# Patient Record
Sex: FEMALE | Race: BLACK OR AFRICAN AMERICAN | NOT HISPANIC OR LATINO
[De-identification: names, ages, dates, MRNs, and addresses within clinical notes are randomized per-mention and may not be internally consistent; named-entity substitution may affect disease eponyms.]

---

## 2017-03-26 DIAGNOSIS — Z78.9 OTHER SPECIFIED HEALTH STATUS: ICD-10-CM

## 2017-03-26 DIAGNOSIS — Z00.5 ENCOUNTER FOR EXAMINATION OF POTENTIAL DONOR OF ORGAN AND TISSUE: ICD-10-CM

## 2017-03-26 DIAGNOSIS — Z86.59 PERSONAL HISTORY OF OTHER MENTAL AND BEHAVIORAL DISORDERS: ICD-10-CM

## 2017-05-17 PROBLEM — Z00.00 ENCOUNTER FOR PREVENTIVE HEALTH EXAMINATION: Status: ACTIVE | Noted: 2017-05-17

## 2017-05-19 ENCOUNTER — APPOINTMENT (OUTPATIENT)
Dept: TRANSPLANT | Facility: CLINIC | Age: 27
End: 2017-05-19

## 2017-05-24 PROBLEM — Z00.5 WILLING TO BE KIDNEY DONOR: Status: ACTIVE | Noted: 2017-05-24

## 2017-05-24 PROBLEM — Z78.9 SOCIAL ALCOHOL USE: Status: ACTIVE | Noted: 2017-05-24

## 2017-05-24 PROBLEM — Z86.59 HISTORY OF MAJOR DEPRESSION: Status: RESOLVED | Noted: 2017-05-24 | Resolved: 2017-05-24

## 2017-06-05 ENCOUNTER — APPOINTMENT (OUTPATIENT)
Dept: TRANSPLANT | Facility: CLINIC | Age: 27
End: 2017-06-05

## 2017-06-05 ENCOUNTER — APPOINTMENT (OUTPATIENT)
Dept: NEPHROLOGY | Facility: CLINIC | Age: 27
End: 2017-06-05

## 2017-06-05 ENCOUNTER — APPOINTMENT (OUTPATIENT)
Dept: CT IMAGING | Facility: IMAGING CENTER | Age: 27
End: 2017-06-05

## 2017-06-19 ENCOUNTER — APPOINTMENT (OUTPATIENT)
Dept: NEPHROLOGY | Facility: CLINIC | Age: 27
End: 2017-06-19

## 2017-06-19 ENCOUNTER — APPOINTMENT (OUTPATIENT)
Dept: TRANSPLANT | Facility: CLINIC | Age: 27
End: 2017-06-19

## 2017-06-19 ENCOUNTER — APPOINTMENT (OUTPATIENT)
Dept: CT IMAGING | Facility: CLINIC | Age: 27
End: 2017-06-19

## 2017-06-19 ENCOUNTER — OUTPATIENT (OUTPATIENT)
Dept: OUTPATIENT SERVICES | Facility: HOSPITAL | Age: 27
LOS: 1 days | End: 2017-06-19
Payer: COMMERCIAL

## 2017-06-19 VITALS
WEIGHT: 138 LBS | BODY MASS INDEX: 22.18 KG/M2 | HEART RATE: 72 BPM | TEMPERATURE: 99 F | SYSTOLIC BLOOD PRESSURE: 119 MMHG | HEIGHT: 66 IN | OXYGEN SATURATION: 99 % | DIASTOLIC BLOOD PRESSURE: 72 MMHG | RESPIRATION RATE: 14 BRPM

## 2017-06-19 VITALS — BODY MASS INDEX: 23.07 KG/M2 | HEIGHT: 65 IN | WEIGHT: 138.5 LBS

## 2017-06-19 DIAGNOSIS — D25.9 LEIOMYOMA OF UTERUS, UNSPECIFIED: ICD-10-CM

## 2017-06-19 DIAGNOSIS — N92.0 EXCESSIVE AND FREQUENT MENSTRUATION WITH REGULAR CYCLE: ICD-10-CM

## 2017-06-19 DIAGNOSIS — F41.9 ANXIETY DISORDER, UNSPECIFIED: ICD-10-CM

## 2017-06-19 DIAGNOSIS — Z00.5 ENCOUNTER FOR EXAMINATION OF POTENTIAL DONOR OF ORGAN AND TISSUE: ICD-10-CM

## 2017-06-19 DIAGNOSIS — F32.9 ANXIETY DISORDER, UNSPECIFIED: ICD-10-CM

## 2017-06-19 DIAGNOSIS — Z82.49 FAMILY HISTORY OF ISCHEMIC HEART DISEASE AND OTHER DISEASES OF THE CIRCULATORY SYSTEM: ICD-10-CM

## 2017-06-19 PROCEDURE — 71046 X-RAY EXAM CHEST 2 VIEWS: CPT

## 2017-06-19 RX ORDER — NORETHINDRONE ACETATE AND ETHINYL ESTRADIOL 1; 20 MG/1; UG/1
1-20 TABLET ORAL DAILY
Qty: 30 | Refills: 0 | Status: ACTIVE | COMMUNITY
Start: 2017-06-19

## 2017-06-19 RX ORDER — IBUPROFEN 600 MG/1
600 TABLET, FILM COATED ORAL
Qty: 30 | Refills: 0 | Status: ACTIVE | COMMUNITY
Start: 2017-06-19

## 2017-06-21 PROBLEM — F41.9 ANXIETY AND DEPRESSION: Status: ACTIVE | Noted: 2017-06-21

## 2017-06-21 PROBLEM — N92.0 MENORRHAGIA WITH REGULAR CYCLE: Status: ACTIVE | Noted: 2017-06-21

## 2017-06-29 DIAGNOSIS — Z52.4 KIDNEY DONOR: ICD-10-CM

## 2018-01-19 ENCOUNTER — INPATIENT (INPATIENT)
Facility: HOSPITAL | Age: 28
LOS: 2 days | Discharge: HOME | End: 2018-01-22
Attending: INTERNAL MEDICINE | Admitting: INTERNAL MEDICINE
Payer: MEDICAID

## 2018-01-19 VITALS — HEIGHT: 65 IN | WEIGHT: 139.99 LBS

## 2018-01-19 DIAGNOSIS — F32.9 MAJOR DEPRESSIVE DISORDER, SINGLE EPISODE, UNSPECIFIED: ICD-10-CM

## 2018-01-19 DIAGNOSIS — R56.9 UNSPECIFIED CONVULSIONS: ICD-10-CM

## 2018-01-19 LAB
ALBUMIN SERPL ELPH-MCNC: 4.2 G/DL — SIGNIFICANT CHANGE UP (ref 3.3–5)
ALP SERPL-CCNC: 65 U/L — SIGNIFICANT CHANGE UP (ref 40–120)
ALT FLD-CCNC: 21 U/L — SIGNIFICANT CHANGE UP (ref 12–78)
ANION GAP SERPL CALC-SCNC: 6 MMOL/L — SIGNIFICANT CHANGE UP (ref 5–17)
APTT BLD: 29.1 SEC — SIGNIFICANT CHANGE UP (ref 27.5–37.4)
AST SERPL-CCNC: 13 U/L — LOW (ref 15–37)
BILIRUB SERPL-MCNC: 0.2 MG/DL — SIGNIFICANT CHANGE UP (ref 0.2–1.2)
BUN SERPL-MCNC: 9 MG/DL — SIGNIFICANT CHANGE UP (ref 7–23)
CALCIUM SERPL-MCNC: 9.1 MG/DL — SIGNIFICANT CHANGE UP (ref 8.5–10.1)
CHLORIDE SERPL-SCNC: 104 MMOL/L — SIGNIFICANT CHANGE UP (ref 96–108)
CO2 SERPL-SCNC: 28 MMOL/L — SIGNIFICANT CHANGE UP (ref 22–31)
CREAT SERPL-MCNC: 0.99 MG/DL — SIGNIFICANT CHANGE UP (ref 0.5–1.3)
GLUCOSE BLDC GLUCOMTR-MCNC: 63 MG/DL — LOW (ref 70–99)
GLUCOSE SERPL-MCNC: 65 MG/DL — LOW (ref 70–99)
HCG SERPL-ACNC: <1 MIU/ML — SIGNIFICANT CHANGE UP
HCT VFR BLD CALC: 37.8 % — SIGNIFICANT CHANGE UP (ref 34.5–45)
HGB BLD-MCNC: 12.5 G/DL — SIGNIFICANT CHANGE UP (ref 11.5–15.5)
INR BLD: 1.13 RATIO — SIGNIFICANT CHANGE UP (ref 0.88–1.16)
MCHC RBC-ENTMCNC: 28.4 PG — SIGNIFICANT CHANGE UP (ref 27–34)
MCHC RBC-ENTMCNC: 33.1 GM/DL — SIGNIFICANT CHANGE UP (ref 32–36)
MCV RBC AUTO: 85.7 FL — SIGNIFICANT CHANGE UP (ref 80–100)
NRBC # BLD: ABNORMAL (ref 0–0)
PLATELET # BLD AUTO: 252 K/UL — SIGNIFICANT CHANGE UP (ref 150–400)
POTASSIUM SERPL-MCNC: 4.3 MMOL/L — SIGNIFICANT CHANGE UP (ref 3.5–5.3)
POTASSIUM SERPL-SCNC: 4.3 MMOL/L — SIGNIFICANT CHANGE UP (ref 3.5–5.3)
PROT SERPL-MCNC: 8 GM/DL — SIGNIFICANT CHANGE UP (ref 6–8.3)
PROTHROM AB SERPL-ACNC: 12.3 SEC — SIGNIFICANT CHANGE UP (ref 9.8–12.7)
RBC # BLD: 4.41 M/UL — SIGNIFICANT CHANGE UP (ref 3.8–5.2)
RBC # FLD: 12.4 % — SIGNIFICANT CHANGE UP (ref 11–15)
SODIUM SERPL-SCNC: 138 MMOL/L — SIGNIFICANT CHANGE UP (ref 135–145)
WBC # BLD: 3.4 K/UL — LOW (ref 3.8–10.5)
WBC # FLD AUTO: 3.4 K/UL — LOW (ref 3.8–10.5)

## 2018-01-19 PROCEDURE — 99285 EMERGENCY DEPT VISIT HI MDM: CPT

## 2018-01-19 PROCEDURE — 70450 CT HEAD/BRAIN W/O DYE: CPT | Mod: 26

## 2018-01-19 RX ORDER — SODIUM CHLORIDE 9 MG/ML
1000 INJECTION, SOLUTION INTRAVENOUS
Qty: 0 | Refills: 0 | Status: DISCONTINUED | OUTPATIENT
Start: 2018-01-19 | End: 2018-01-22

## 2018-01-19 RX ORDER — BUPROPION HYDROCHLORIDE 150 MG/1
150 TABLET, EXTENDED RELEASE ORAL DAILY
Qty: 0 | Refills: 0 | Status: DISCONTINUED | OUTPATIENT
Start: 2018-01-19 | End: 2018-01-22

## 2018-01-19 RX ADMIN — SODIUM CHLORIDE 70 MILLILITER(S): 9 INJECTION, SOLUTION INTRAVENOUS at 23:22

## 2018-01-19 NOTE — H&P ADULT - HISTORY OF PRESENT ILLNESS
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 Seizure was witnessed. . has some sup lacs to the forehead. . patient is alert awake now

## 2018-01-19 NOTE — H&P ADULT - NSHPLABSRESULTS_GEN_ALL_CORE
12.5   3.4   )-----------( 252      ( 19 Jan 2018 17:05 )             37.8     01-19    138  |  104  |  9   ----------------------------<  65<L>  4.3   |  28  |  0.99    Ca    9.1      19 Jan 2018 17:05    TPro  8.0  /  Alb  4.2  /  TBili  0.2  /  DBili  x   /  AST  13<L>  /  ALT  21  /  AlkPhos  65  01-19    PT/INR - ( 19 Jan 2018 17:05 )   PT: 12.3 sec;   INR: 1.13 ratio         PTT - ( 19 Jan 2018 17:05 )  PTT:29.1 sec

## 2018-01-19 NOTE — ED PROVIDER NOTE - HEAD SHAPE
normal cephalic/2 small lacertion, skin flaps, >25 cm . annot suture. cleaned and  steristrips applied.

## 2018-01-19 NOTE — ED PROVIDER NOTE - OBJECTIVE STATEMENT
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 Seizure was witnesed. . has some sup lacs to the forehead. . patient is alert awake now , ambulating and able to provide good hx. no hx of medical problems other than depression for which she takes Bupropion 450 mg daily. Has no cardiac disease. patient denies any headache, neck pains. No hx of recent fever.. Has hx of fibroids and last month had surgery for it. . Not . no children  No significant social or family hx.. 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 Seizure was witnesed. . has some sup lacs to the forehead. . patient is alert awake now , ambulating and able to provide good hx. no hx of medical problems other than depression for which she takes Bupropion 450 mg daily. Has no cardiac disease. patient denies any headache, neck pains. No hx of recent fever.. Has hx of fibroids and last month had surgery for it. . Not . no children  No significant social or family hx.. 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 Seizure was witnessed. . has some sup lacs to the forehead. . patient is alert awake now , ambulating and able to provide good hx. no hx of medical problems other than depression for which she takes Bupropion 450 mg daily. Has no cardiac disease. patient denies any headache, neck pains. No hx of recent fever.. Has hx of fibroids and last month had surgery for it. . Not . no children  No significant social or family hx.. no allergy to medications. Had Td immunization within the last 10 years as per pt.

## 2018-01-19 NOTE — H&P ADULT - ASSESSMENT
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 Seizure was witnessed.

## 2018-01-20 ENCOUNTER — TRANSCRIPTION ENCOUNTER (OUTPATIENT)
Age: 28
End: 2018-01-20

## 2018-01-20 RX ADMIN — BUPROPION HYDROCHLORIDE 150 MILLIGRAM(S): 150 TABLET, EXTENDED RELEASE ORAL at 11:05

## 2018-01-20 NOTE — DISCHARGE NOTE ADULT - CARE PROVIDER_API CALL
Zachary Oleary (), Internal Medicine  [address] [place] [address]  Phone: [phone]  Fax: [phone]    Esequiel Ramirez), Neurology  [address] [place] [address]  Phone: [phone]  Fax: [phone]    Psychiatrist,   Phone: (   )    -  Fax: (   )    -

## 2018-01-20 NOTE — DISCHARGE NOTE ADULT - MEDICATION SUMMARY - MEDICATIONS TO CHANGE
I will SWITCH the dose or number of times a day I take the medications listed below when I get home from the hospital:  None I will SWITCH the dose or number of times a day I take the medications listed below when I get home from the hospital:    buPROPion 450 mg/24 hours (XL) oral tablet, extended release  -- 1 tab(s) by mouth every 24 hours

## 2018-01-20 NOTE — DISCHARGE NOTE ADULT - PROVIDER TOKENS
TOKEN:'[id number]:'[id number]',FREE:[LAST:[Psychiatrist],PHONE:[(   )    -],FAX:[(   )    -]]

## 2018-01-20 NOTE — DISCHARGE NOTE ADULT - HOSPITAL COURSE
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 Seizure was witnessed. . 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 Seizure was witnessed.    Stable for discharge home w/ outpt follow up with PMD and psychiatrist.

## 2018-01-20 NOTE — DISCHARGE NOTE ADULT - PLAN OF CARE
follow up with Dr CATHERINE [phone] follow up with pmd in 1 week Follow up with psychiatrist within 1 week to adjust meds with intent to discontinue use of Bupropion in observance of seizure

## 2018-01-20 NOTE — DISCHARGE NOTE ADULT - MEDICATION SUMMARY - MEDICATIONS TO TAKE
I will START or STAY ON the medications listed below when I get home from the hospital:    buPROPion 450 mg/24 hours (XL) oral tablet, extended release  -- 1 tab(s) by mouth every 24 hours  -- Indication: For Depression, unspecified depression type I will START or STAY ON the medications listed below when I get home from the hospital:    Remeron 45 mg oral tablet  -- 1 tab(s) by mouth once a day (at bedtime)  -- Indication: For Antidepressant    buPROPion 150 mg/24 hours (XL) oral tablet, extended release  -- 1 tab(s) by mouth once a day  -- Indication: For Antidepressant

## 2018-01-20 NOTE — PROGRESS NOTE ADULT - SUBJECTIVE AND OBJECTIVE BOX
Patient is a 27y old  Female who presents with a chief complaint of seizure (20 Jan 2018 16:37)      INTERVAL HPI/OVERNIGHT EVENTS:  no more seizure activity  MEDICATIONS  (STANDING):  buPROPion XL . 150 milliGRAM(s) Oral daily  sodium chloride 0.45%. 1000 milliLiter(s) (70 mL/Hr) IV Continuous <Continuous>    MEDICATIONS  (PRN):      Allergies    No Known Allergies    Intolerances        REVIEW OF SYSTEMS:  CONSTITUTIONAL: No fever, weight loss, or fatigue  EYES: No eye pain, visual disturbances, or discharge  ENMT:  No difficulty hearing, tinnitus, vertigo; No sinus or throat pain  NECK: No pain or stiffness  BREASTS: No pain, masses, or nipple discharge  RESPIRATORY: No cough, wheezing, chills or hemoptysis; No shortness of breath  CARDIOVASCULAR: No chest pain, palpitations, dizziness, or leg swelling  GASTROINTESTINAL: No abdominal or epigastric pain. No nausea, vomiting, or hematemesis; No diarrhea or constipation. No melena or hematochezia.  GENITOURINARY: No dysuria, frequency, hematuria, or incontinence  NEUROLOGICAL: No headaches, memory loss, loss of strength, numbness, or tremors  SKIN: No itching, burning, rashes, or lesions   LYMPH NODES: No enlarged glands  ENDOCRINE: No heat or cold intolerance; No hair loss  MUSCULOSKELETAL: No joint pain or swelling; No muscle, back, or extremity pain  PSYCHIATRIC: No depression, anxiety, mood swings, or difficulty sleeping  HEME/LYMPH: No easy bruising, or bleeding gums  ALLERGY AND IMMUNOLOGIC: No hives or eczema    Vital Signs Last 24 Hrs  T(C): 36.6 (20 Jan 2018 11:14), Max: 36.8 (19 Jan 2018 19:27)  T(F): 97.9 (20 Jan 2018 11:14), Max: 98.2 (19 Jan 2018 19:27)  HR: 91 (20 Jan 2018 11:14) (71 - 96)  BP: 108/60 (20 Jan 2018 11:14) (99/51 - 114/69)  BP(mean): --  RR: 16 (20 Jan 2018 11:14) (16 - 17)  SpO2: 100% (20 Jan 2018 11:14) (97% - 100%)    PHYSICAL EXAM:  GENERAL: NAD, well-groomed, well-developed  HEAD:  Atraumatic, Normocephalic  EYES: EOMI, PERRLA, conjunctiva and sclera clear  ENMT: No tonsillar erythema, exudates, or enlargement; Moist mucous membranes, Good dentition, No lesions  NECK: Supple, No JVD, Normal thyroid  NERVOUS SYSTEM:  Alert & Oriented X3, Good concentration; Motor Strength 5/5 B/L upper and lower extremities; DTRs 2+ intact and symmetric  CHEST/LUNG: Clear to percussion bilaterally; No rales, rhonchi, wheezing, or rubs  HEART: Regular rate and rhythm; No murmurs, rubs, or gallops  ABDOMEN: Soft, Nontender, Nondistended; Bowel sounds present  EXTREMITIES:  2+ Peripheral Pulses, No clubbing, cyanosis, or edema  LYMPH: No lymphadenopathy noted  SKIN: No rashes or lesions    LABS:                        12.5   3.4   )-----------( 252      ( 19 Jan 2018 17:05 )             37.8     01-19    138  |  104  |  9   ----------------------------<  65<L>  4.3   |  28  |  0.99    Ca    9.1      19 Jan 2018 17:05    TPro  8.0  /  Alb  4.2  /  TBili  0.2  /  DBili  x   /  AST  13<L>  /  ALT  21  /  AlkPhos  65  01-19    PT/INR - ( 19 Jan 2018 17:05 )   PT: 12.3 sec;   INR: 1.13 ratio         PTT - ( 19 Jan 2018 17:05 )  PTT:29.1 sec    CAPILLARY BLOOD GLUCOSE        CULTURES:    HEMOGLOBIN A1C:    CHOLESTEROL:        RADIOLOGY & ADDITIONAL TESTS:

## 2018-01-21 PROCEDURE — 70551 MRI BRAIN STEM W/O DYE: CPT | Mod: 26

## 2018-01-21 RX ADMIN — BUPROPION HYDROCHLORIDE 150 MILLIGRAM(S): 150 TABLET, EXTENDED RELEASE ORAL at 11:07

## 2018-01-21 RX ADMIN — SODIUM CHLORIDE 70 MILLILITER(S): 9 INJECTION, SOLUTION INTRAVENOUS at 22:52

## 2018-01-21 NOTE — PROGRESS NOTE ADULT - SUBJECTIVE AND OBJECTIVE BOX
Patient is a 27y old  Female who presents with a chief complaint of seizure (20 Jan 2018 16:37)      INTERVAL HPI/OVERNIGHT EVENTS:  pt is doing well  MEDICATIONS  (STANDING):  buPROPion XL . 150 milliGRAM(s) Oral daily  sodium chloride 0.45%. 1000 milliLiter(s) (70 mL/Hr) IV Continuous <Continuous>    MEDICATIONS  (PRN):      Allergies    No Known Allergies    Intolerances        REVIEW OF SYSTEMS:  CONSTITUTIONAL: No fever, weight loss, or fatigue  EYES: No eye pain, visual disturbances, or discharge  ENMT:  No difficulty hearing, tinnitus, vertigo; No sinus or throat pain  NECK: No pain or stiffness  BREASTS: No pain, masses, or nipple discharge  RESPIRATORY: No cough, wheezing, chills or hemoptysis; No shortness of breath  CARDIOVASCULAR: No chest pain, palpitations, dizziness, or leg swelling  GASTROINTESTINAL: No abdominal or epigastric pain. No nausea, vomiting, or hematemesis; No diarrhea or constipation. No melena or hematochezia.  GENITOURINARY: No dysuria, frequency, hematuria, or incontinence  NEUROLOGICAL: No headaches, memory loss, loss of strength, numbness, or tremors  SKIN: No itching, burning, rashes, or lesions   LYMPH NODES: No enlarged glands  ENDOCRINE: No heat or cold intolerance; No hair loss  MUSCULOSKELETAL: No joint pain or swelling; No muscle, back, or extremity pain  PSYCHIATRIC: No depression, anxiety, mood swings, or difficulty sleeping  HEME/LYMPH: No easy bruising, or bleeding gums  ALLERGY AND IMMUNOLOGIC: No hives or eczema    Vital Signs Last 24 Hrs  T(C): 36.1 (21 Jan 2018 11:39), Max: 36.7 (20 Jan 2018 17:32)  T(F): 97 (21 Jan 2018 11:39), Max: 98 (20 Jan 2018 17:32)  HR: 85 (21 Jan 2018 11:39) (70 - 86)  BP: 111/68 (21 Jan 2018 11:39) (105/71 - 144/71)  BP(mean): --  RR: 16 (21 Jan 2018 11:39) (16 - 16)  SpO2: 99% (21 Jan 2018 11:39) (98% - 100%)    PHYSICAL EXAM:  GENERAL: NAD, well-groomed, well-developed  HEAD:  Atraumatic, Normocephalic  EYES: EOMI, PERRLA, conjunctiva and sclera clear  ENMT: No tonsillar erythema, exudates, or enlargement; Moist mucous membranes, Good dentition, No lesions  NECK: Supple, No JVD, Normal thyroid  NERVOUS SYSTEM:  Alert & Oriented X3, Good concentration; Motor Strength 5/5 B/L upper and lower extremities; DTRs 2+ intact and symmetric  CHEST/LUNG: Clear to percussion bilaterally; No rales, rhonchi, wheezing, or rubs  HEART: Regular rate and rhythm; No murmurs, rubs, or gallops  ABDOMEN: Soft, Nontender, Nondistended; Bowel sounds present  EXTREMITIES:  2+ Peripheral Pulses, No clubbing, cyanosis, or edema  LYMPH: No lymphadenopathy noted  SKIN: No rashes or lesions    LABS:              CAPILLARY BLOOD GLUCOSE        CULTURES:    HEMOGLOBIN A1C:    CHOLESTEROL:        RADIOLOGY & ADDITIONAL TESTS:

## 2018-01-21 NOTE — CONSULT NOTE ADULT - ASSESSMENT
Historian:     Patient.  ER notes also reviewed.  Brain CT and MRI are both normal.  HPI:   NICKI LUGO .. 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 Seizure was witnessed. . Pt sustained some sup lacerations to the forehead. . At ER, patient was alert awake now (19 Jan 2018 23:11)     With her consent, I spoke to her uncle, Mr. Malik who is inquiring about her condition.  Gave him report of CT and MRI.  EEG to be done tomorrow.  PAST MEDICAL & SURGICAL HISTORY:  Depression on Wellbutrin and Remeron    MEDICATIONS  (STANDING):  buPROPion XL . 150 milliGRAM(s) Oral daily  sodium chloride 0.45%. 1000 milliLiter(s) (70 mL/Hr) IV Continuous <Continuous>    MEDICATIONS  (PRN):  Allergies: No Known Allergies  Intolerances  FAMILY HISTORY:    Vital Signs Last 24 Hrs  T(C): 36.6 (21 Jan 2018 17:24), Max: 36.6 (20 Jan 2018 23:38)  T(F): 97.8 (21 Jan 2018 17:24), Max: 97.8 (20 Jan 2018 23:38)  HR: 93 (21 Jan 2018 17:24) (70 - 93)  BP: 113/63 (21 Jan 2018 17:24) (105/71 - 113/69)  BP(mean): --  RR: 16 (21 Jan 2018 11:39) (16 - 16)  SpO2: 100% (21 Jan 2018 17:24) (99% - 100%)    NEUROLOGICAL EXAM:  HENT:  Normocephalic head; forehead superficial lacerations.  Neck supple.  ENT: normal looking.  Mental State:    Alert.  Fully oriented to person, place and date.  Coherent.  Speech clear and intact.  Cooperative.  Responds appropriately.    Cranial Nerves:  II-XII:   Pupils round and reactive to light and accommodation.  Extraocular movements full.  Visual fields full (no homonymous hemianopsia).  Visual acuity wnl.  Facial symmetry intact.  Tongue midline.  Motor Functions:  Moves all extremities.  No pronator drift of UE.  Claps hands well.  Hand  intact bilaterally.      Sensory Functions:   Intact to touch and pinprick to face and extremities.  Reflexes:  Deep tendon reflexes normoactive to biceps, knees and ankles.    Cerebellar Testing:    Finger to nose intact.  Nystagmus absent.  Neurovascular: Carotid auscultation full without bruits.    LABS:  RADIOLOGY & ADDITIONAL STUDIES:      Assessment & Opinion:  Seizure Disorder, first episode, etiology to be determined.  Normal neurologic exam  Recommendations:  Echocardiogram.  EEG.     Medications:  Defer anti-convulsant for now.  Will follow after EEG.

## 2018-01-22 VITALS
TEMPERATURE: 97 F | OXYGEN SATURATION: 98 % | WEIGHT: 127.87 LBS | DIASTOLIC BLOOD PRESSURE: 57 MMHG | SYSTOLIC BLOOD PRESSURE: 98 MMHG | HEART RATE: 68 BPM | RESPIRATION RATE: 16 BRPM

## 2018-01-22 RX ORDER — BUPROPION HYDROCHLORIDE 150 MG/1
1 TABLET, EXTENDED RELEASE ORAL
Qty: 14 | Refills: 0 | OUTPATIENT
Start: 2018-01-22 | End: 2018-02-04

## 2018-01-22 RX ORDER — MIRTAZAPINE 45 MG/1
1 TABLET, ORALLY DISINTEGRATING ORAL
Qty: 0 | Refills: 0 | COMMUNITY

## 2018-01-22 RX ORDER — BUPROPION HYDROCHLORIDE 150 MG/1
1 TABLET, EXTENDED RELEASE ORAL
Qty: 0 | Refills: 0 | COMMUNITY

## 2018-01-22 RX ADMIN — SODIUM CHLORIDE 70 MILLILITER(S): 9 INJECTION, SOLUTION INTRAVENOUS at 11:45

## 2018-01-22 RX ADMIN — BUPROPION HYDROCHLORIDE 150 MILLIGRAM(S): 150 TABLET, EXTENDED RELEASE ORAL at 11:45

## 2018-01-22 NOTE — CHART NOTE - NSCHARTNOTEFT_GEN_A_CORE
Hospitalist Medicine PA    Discussed patient's case with Dr. Monaco who recommends patient continue on Bupropion  daily in observance of new seizure. Patient educated that bupriopion can lower seizure threshold. Patient urged to see psychiatrist as outpatient as soon as possible to adjust medications. Dr. Monaco recommends outpatient psychiatrist to d/c bupropion and switch to another antidepressant as appropriate.

## 2018-01-22 NOTE — PROGRESS NOTE ADULT - SUBJECTIVE AND OBJECTIVE BOX
INTERVAL HPI/OVERNIGHT EVENTS:  Subjective Complaints:  None    RECENT RADIOLOGY & ADDITIONAL TESTS:  EEG done today result pending.    NEUROLOGICAL EXAM (Pertinent):  Vital Signs Last 24 Hrs  T(C): 36.1 (22 Jan 2018 05:39), Max: 36.6 (21 Jan 2018 17:24)  T(F): 96.9 (22 Jan 2018 05:39), Max: 97.8 (21 Jan 2018 17:24)  HR: 68 (22 Jan 2018 05:39) (68 - 93)  BP: 98/57 (22 Jan 2018 05:39) (98/57 - 113/63)  BP(mean): --  RR: 16 (22 Jan 2018 05:39) (16 - 16)  SpO2: 98% (22 Jan 2018 05:39) (98% - 100%)    MEDICATIONS  (STANDING):  buPROPion XL . 150 milliGRAM(s) Oral daily  sodium chloride 0.45%. 1000 milliLiter(s) (70 mL/Hr) IV Continuous <Continuous>    MEDICATIONS  (PRN):    LABS:    ASSESSMENT & OPINION:  No recurrent seizure episode.  RECOMMENDATIONS:  No anticonvulsant.  Neurologically cleared for discharge. Office follow up.

## 2018-01-24 DIAGNOSIS — F32.9 MAJOR DEPRESSIVE DISORDER, SINGLE EPISODE, UNSPECIFIED: ICD-10-CM

## 2018-01-24 DIAGNOSIS — G40.909 EPILEPSY, UNSPECIFIED, NOT INTRACTABLE, WITHOUT STATUS EPILEPTICUS: ICD-10-CM

## 2019-09-03 NOTE — PATIENT PROFILE ADULT. - NS TRANSFER DISPOSITION PATIENT BELONGINGS
writer and Alireza Chapin processed this at [facility]  This with patient

## 2020-05-09 ENCOUNTER — EMERGENCY (EMERGENCY)
Facility: HOSPITAL | Age: 30
LOS: 1 days | Discharge: HOME | End: 2020-05-09
Admitting: PERSONAL EMERGENCY RESPONSE ATTENDANT
Payer: COMMERCIAL

## 2020-05-09 VITALS
SYSTOLIC BLOOD PRESSURE: 133 MMHG | TEMPERATURE: 98 F | HEART RATE: 104 BPM | DIASTOLIC BLOOD PRESSURE: 93 MMHG | RESPIRATION RATE: 20 BRPM | OXYGEN SATURATION: 98 %

## 2020-05-09 LAB
HCT VFR BLD CALC: 35.3 % — SIGNIFICANT CHANGE UP (ref 34.5–45)
HGB BLD-MCNC: 11.7 G/DL — SIGNIFICANT CHANGE UP (ref 11.5–15.5)
MCHC RBC-ENTMCNC: 27.6 PG — SIGNIFICANT CHANGE UP (ref 27–34)
MCHC RBC-ENTMCNC: 33.1 % — SIGNIFICANT CHANGE UP (ref 32–36)
MCV RBC AUTO: 83.3 FL — SIGNIFICANT CHANGE UP (ref 80–100)
PLATELET # BLD AUTO: 245 K/UL — SIGNIFICANT CHANGE UP (ref 150–400)
PMV BLD: 10.8 FL — SIGNIFICANT CHANGE UP (ref 7–13)
RBC # BLD: 4.24 M/UL — SIGNIFICANT CHANGE UP (ref 3.8–5.2)
RBC # FLD: 13.7 % — SIGNIFICANT CHANGE UP (ref 10.3–14.5)

## 2020-05-09 PROCEDURE — 93010 ELECTROCARDIOGRAM REPORT: CPT | Mod: NC

## 2020-05-09 PROCEDURE — 99284 EMERGENCY DEPT VISIT MOD MDM: CPT | Mod: 25

## 2020-05-09 RX ORDER — HALOPERIDOL DECANOATE 100 MG/ML
5 INJECTION INTRAMUSCULAR ONCE
Refills: 0 | Status: COMPLETED | OUTPATIENT
Start: 2020-05-09 | End: 2020-05-09

## 2020-05-09 RX ADMIN — HALOPERIDOL DECANOATE 5 MILLIGRAM(S): 100 INJECTION INTRAMUSCULAR at 23:32

## 2020-05-09 NOTE — ED PROVIDER NOTE - NSFOLLOWUPINSTRUCTIONS_ED_ALL_ED_FT
1.  Please avoid all use of marijuana or K2.    2.  Please follow-up with your outpatient psychiatric provider or the crisis center (information listed below).    3.  Please return to the emergency department should you develop a desire to harm yourself or others.

## 2020-05-09 NOTE — ED ADULT TRIAGE NOTE - CHIEF COMPLAINT QUOTE
as per patient, "I got something from my friend after work.- unsure if it was K2" as per EMS, Patient took something from her friend, on the way back home she called her friend, and the friend recognized she was not acting like herself and called EMS. Patient noted with erratic behavior in ED from being calm 1 minute to loud outburst. patient has extreme anxiety at times to sudden laughter. patient stating , "I don't know what I took, am I going to be alright". Patient denies suicidal thoughts, no thoughts to harm others. no visual or auditory hallucination. Patient denies alcohol use tonight as per patient, "I got something from my friend after work.- unsure if it was K2" as per EMS, Patient took something from her friend, on the way back home she called her friend, and the friend recognized she was not acting like herself and called EMS. Patient noted with erratic behavior in ED from being calm 1 minute to loud outburst. patient has extreme anxiety at times to sudden laughter. patient stating , "I don't know what I took, am I going to be alright". Patient denies suicidal thoughts, no thoughts to harm others. no visual or auditory hallucination. Patient denies alcohol use tonight accompanied by [employer]. not under arrest as per patient, "I got something from my friend after work.- unsure if it was K2" as per EMS, Patient took something from her friend, on the way back home she called her friend, and the friend recognized she was not acting like herself and called EMS. Patient noted with erratic behavior in ED from being calm 1 minute to loud outburst. patient has extreme anxiety at times to sudden laughter. patient stating , "I don't know what I took, am I going to be alright". Patient denies suicidal thoughts, no thoughts to harm others. no visual or auditory hallucination. Patient denies alcohol use tonight accompanied by [employer]. not under arrest Medical history- depression

## 2020-05-09 NOTE — ED PROVIDER NOTE - PROGRESS NOTE DETAILS
Attending MD Hanson.  Pt signed out to me in stable condition pending Labs, psych consult, likely TBA, Hx of MDD, paranoid and delusional tonight, sees demons, may have taken K2, had shot of haldol, psych called and likely TBA. Attending MD Hanson.  PT seen and cleared by psych.  Delusional presentation likely attributable to use of marijuana and K2 which pt endorses.  Psych attending calling parents to pick pt up.  Stable for discharge home.

## 2020-05-09 NOTE — ED PROVIDER NOTE - NSFOLLOWUPCLINICS_GEN_ALL_ED_FT
[facility]  Behavioral Health  [address] [place] [address]  Phone: [phone]  Fax:   Follow Up Time:

## 2020-05-09 NOTE — ED PROVIDER NOTE - PATIENT PORTAL LINK FT
You can access the FollowMyHealth Patient Portal offered by [facility] by registering at the following website: [email or web address]. By joining [facility]’s FollowMyHealth portal, you will also be able to view your health information using other applications (apps) compatible with our system.

## 2020-05-09 NOTE — ED PROVIDER NOTE - OBJECTIVE STATEMENT
28 y/o  F hx MDD  BIBA  secondary to gloria  and acting out behaviour.  Patient appears paranoid and disorganized expressing a flight of ideas. States " Demons are chasing me".   Denies SI/HI/AH/VH.  Denies pain,  SOB, fever, chills, chest/abdominal discomfort. Denies falling, punching or kicking  any objects.  No evidence of physical injuries., broken skin or deformities.  Denies use of alcohol or illicit  drugs.

## 2020-05-09 NOTE — ED ADULT NURSE NOTE - CHIEF COMPLAINT QUOTE
as per patient, "I got something from my friend after work.- unsure if it was K2" as per EMS, Patient took something from her friend, on the way back home she called her friend, and the friend recognized she was not acting like herself and called EMS. Patient noted with erratic behavior in ED from being calm 1 minute to loud outburst. patient has extreme anxiety at times to sudden laughter. patient stating , "I don't know what I took, am I going to be alright". Patient denies suicidal thoughts, no thoughts to harm others. no visual or auditory hallucination. Patient denies alcohol use tonight accompanied by [employer]. not under arrest Medical history- depression

## 2020-05-09 NOTE — ED PROVIDER NOTE - CLINICAL SUMMARY MEDICAL DECISION MAKING FREE TEXT BOX
Labs, Urine Tox/UA, HCG, EKG  Medical evaluation performed. There is no clinical evidence of intoxication or any acute medical problem requiring immediate intervention. Patient is awaiting psychiatric consultation. Final disposition will be determined by psychiatrist. 28 y/o  F hx MDD    Labs, Urine Tox/UA, HCG, EKG  Medical evaluation performed. There is no clinical evidence of intoxication or any acute medical problem requiring immediate intervention. Patient is awaiting psychiatric consultation. Final disposition will be determined by psychiatrist.

## 2020-05-09 NOTE — ED PROVIDER NOTE - CARE PLAN
Principal Discharge DX:	Psychosis Principal Discharge DX:	Psychosis  Secondary Diagnosis:	Marijuana use

## 2020-05-10 VITALS
DIASTOLIC BLOOD PRESSURE: 83 MMHG | RESPIRATION RATE: 16 BRPM | TEMPERATURE: 98 F | SYSTOLIC BLOOD PRESSURE: 122 MMHG | HEART RATE: 87 BPM | OXYGEN SATURATION: 100 %

## 2020-05-10 DIAGNOSIS — F33.0 MAJOR DEPRESSIVE DISORDER, RECURRENT, MILD: ICD-10-CM

## 2020-05-10 LAB
ALBUMIN SERPL ELPH-MCNC: 4.8 G/DL — SIGNIFICANT CHANGE UP (ref 3.3–5)
ALP SERPL-CCNC: 66 U/L — SIGNIFICANT CHANGE UP (ref 40–120)
ALT FLD-CCNC: 11 U/L — SIGNIFICANT CHANGE UP (ref 4–33)
AMPHET UR-MCNC: NEGATIVE — SIGNIFICANT CHANGE UP
ANION GAP SERPL CALC-SCNC: 14 MMO/L — SIGNIFICANT CHANGE UP (ref 7–14)
ANISOCYTOSIS BLD QL: SLIGHT — SIGNIFICANT CHANGE UP
APAP SERPL-MCNC: < 15 UG/ML — LOW (ref 15–25)
APPEARANCE UR: CLEAR — SIGNIFICANT CHANGE UP
AST SERPL-CCNC: 15 U/L — SIGNIFICANT CHANGE UP (ref 4–32)
BARBITURATES UR SCN-MCNC: NEGATIVE — SIGNIFICANT CHANGE UP
BASOPHILS # BLD AUTO: 0.01 K/UL — SIGNIFICANT CHANGE UP (ref 0–0.2)
BASOPHILS NFR BLD AUTO: 0.2 % — SIGNIFICANT CHANGE UP (ref 0–2)
BASOPHILS NFR SPEC: 0.9 % — SIGNIFICANT CHANGE UP (ref 0–2)
BENZODIAZ UR-MCNC: NEGATIVE — SIGNIFICANT CHANGE UP
BILIRUB SERPL-MCNC: 0.2 MG/DL — SIGNIFICANT CHANGE UP (ref 0.2–1.2)
BILIRUB UR-MCNC: NEGATIVE — SIGNIFICANT CHANGE UP
BLASTS # FLD: 0 % — SIGNIFICANT CHANGE UP (ref 0–0)
BLOOD UR QL VISUAL: NEGATIVE — SIGNIFICANT CHANGE UP
BUN SERPL-MCNC: 10 MG/DL — SIGNIFICANT CHANGE UP (ref 7–23)
CALCIUM SERPL-MCNC: 9.9 MG/DL — SIGNIFICANT CHANGE UP (ref 8.4–10.5)
CANNABINOIDS UR-MCNC: POSITIVE — SIGNIFICANT CHANGE UP
CHLORIDE SERPL-SCNC: 103 MMOL/L — SIGNIFICANT CHANGE UP (ref 98–107)
CO2 SERPL-SCNC: 20 MMOL/L — LOW (ref 22–31)
COCAINE METAB.OTHER UR-MCNC: NEGATIVE — SIGNIFICANT CHANGE UP
COLOR SPEC: SIGNIFICANT CHANGE UP
CREAT SERPL-MCNC: 0.7 MG/DL — SIGNIFICANT CHANGE UP (ref 0.5–1.3)
EOSINOPHIL # BLD AUTO: 0.13 K/UL — SIGNIFICANT CHANGE UP (ref 0–0.5)
EOSINOPHIL NFR BLD AUTO: 2.2 % — SIGNIFICANT CHANGE UP (ref 0–6)
EOSINOPHIL NFR FLD: 2.7 % — SIGNIFICANT CHANGE UP (ref 0–6)
ETHANOL BLD-MCNC: < 10 MG/DL — SIGNIFICANT CHANGE UP
GIANT PLATELETS BLD QL SMEAR: PRESENT — SIGNIFICANT CHANGE UP
GLUCOSE SERPL-MCNC: 99 MG/DL — SIGNIFICANT CHANGE UP (ref 70–99)
GLUCOSE UR-MCNC: NEGATIVE — SIGNIFICANT CHANGE UP
HCG SERPL-ACNC: < 5 MIU/ML — SIGNIFICANT CHANGE UP
IMM GRANULOCYTES NFR BLD AUTO: 0.2 % — SIGNIFICANT CHANGE UP (ref 0–1.5)
KETONES UR-MCNC: NEGATIVE — SIGNIFICANT CHANGE UP
LEUKOCYTE ESTERASE UR-ACNC: NEGATIVE — SIGNIFICANT CHANGE UP
LYMPHOCYTES # BLD AUTO: 1.88 K/UL — SIGNIFICANT CHANGE UP (ref 1–3.3)
LYMPHOCYTES # BLD AUTO: 31.9 % — SIGNIFICANT CHANGE UP (ref 13–44)
LYMPHOCYTES NFR SPEC AUTO: 24.1 % — SIGNIFICANT CHANGE UP (ref 13–44)
MACROCYTES BLD QL: SLIGHT — SIGNIFICANT CHANGE UP
METAMYELOCYTES # FLD: 0 % — SIGNIFICANT CHANGE UP (ref 0–1)
METHADONE UR-MCNC: NEGATIVE — SIGNIFICANT CHANGE UP
MONOCYTES # BLD AUTO: 0.41 K/UL — SIGNIFICANT CHANGE UP (ref 0–0.9)
MONOCYTES NFR BLD AUTO: 7 % — SIGNIFICANT CHANGE UP (ref 2–14)
MONOCYTES NFR BLD: 5.4 % — SIGNIFICANT CHANGE UP (ref 2–9)
MYELOCYTES NFR BLD: 0 % — SIGNIFICANT CHANGE UP (ref 0–0)
NEUTROPHIL AB SER-ACNC: 58 % — SIGNIFICANT CHANGE UP (ref 43–77)
NEUTROPHILS # BLD AUTO: 3.45 K/UL — SIGNIFICANT CHANGE UP (ref 1.8–7.4)
NEUTROPHILS NFR BLD AUTO: 58.5 % — SIGNIFICANT CHANGE UP (ref 43–77)
NEUTS BAND # BLD: 0 % — SIGNIFICANT CHANGE UP (ref 0–6)
NITRITE UR-MCNC: NEGATIVE — SIGNIFICANT CHANGE UP
NRBC # FLD: 0 K/UL — SIGNIFICANT CHANGE UP (ref 0–0)
OPIATES UR-MCNC: NEGATIVE — SIGNIFICANT CHANGE UP
OTHER - HEMATOLOGY %: 0 — SIGNIFICANT CHANGE UP
OXYCODONE UR-MCNC: NEGATIVE — SIGNIFICANT CHANGE UP
PCP UR-MCNC: NEGATIVE — SIGNIFICANT CHANGE UP
PH UR: 6 — SIGNIFICANT CHANGE UP (ref 5–8)
PLATELET COUNT - ESTIMATE: NORMAL — SIGNIFICANT CHANGE UP
POIKILOCYTOSIS BLD QL AUTO: SLIGHT — SIGNIFICANT CHANGE UP
POTASSIUM SERPL-MCNC: 3.7 MMOL/L — SIGNIFICANT CHANGE UP (ref 3.5–5.3)
POTASSIUM SERPL-SCNC: 3.7 MMOL/L — SIGNIFICANT CHANGE UP (ref 3.5–5.3)
PROMYELOCYTES # FLD: 0 % — SIGNIFICANT CHANGE UP (ref 0–0)
PROT SERPL-MCNC: 7.6 G/DL — SIGNIFICANT CHANGE UP (ref 6–8.3)
PROT UR-MCNC: NEGATIVE — SIGNIFICANT CHANGE UP
RBC CASTS # UR COMP ASSIST: SIGNIFICANT CHANGE UP (ref 0–?)
REVIEW TO FOLLOW: YES — SIGNIFICANT CHANGE UP
SALICYLATES SERPL-MCNC: < 5 MG/DL — LOW (ref 15–30)
SARS-COV-2 RNA SPEC QL NAA+PROBE: SIGNIFICANT CHANGE UP
SMUDGE CELLS # BLD: PRESENT — SIGNIFICANT CHANGE UP
SODIUM SERPL-SCNC: 137 MMOL/L — SIGNIFICANT CHANGE UP (ref 135–145)
SP GR SPEC: 1.01 — SIGNIFICANT CHANGE UP (ref 1–1.04)
TSH SERPL-MCNC: 0.94 UIU/ML — SIGNIFICANT CHANGE UP (ref 0.27–4.2)
UROBILINOGEN FLD QL: NORMAL — SIGNIFICANT CHANGE UP
VARIANT LYMPHS # BLD: 8.9 % — SIGNIFICANT CHANGE UP
WBC # BLD: 5.89 K/UL — SIGNIFICANT CHANGE UP (ref 3.8–10.5)
WBC # FLD AUTO: 5.89 K/UL — SIGNIFICANT CHANGE UP (ref 3.8–10.5)
WBC UR QL: SIGNIFICANT CHANGE UP (ref 0–?)

## 2020-05-10 PROCEDURE — 90792 PSYCH DIAG EVAL W/MED SRVCS: CPT

## 2020-05-10 NOTE — ED BEHAVIORAL HEALTH ASSESSMENT NOTE - HPI (INCLUDE ILLNESS QUALITY, SEVERITY, DURATION, TIMING, CONTEXT, MODIFYING FACTORS, ASSOCIATED SIGNS AND SYMPTOMS)
The patient is 30 yo single, employed as a , childless, domiciled with her friend AA female with HX of depression, currently in treatment with Dr Butler, compliant with meds and treatment, no prior psych admissions, no HX of SI or SA, no Hx of SIB , was brought to ER after she got agitated while intoxicated.   Patient reports that she used "candies with marijuana, most likely the mj was laced" she does not remember being loud or agitated, states that she is better now, feels rested and wants to go home. She admits that she has history of depression, but states that she is at her baseline, She is sleeping OK, has fair energy level, denies feeling hopeless or helpless but sometimes she feels sad "because what is going on now" , no other new stressors. She denies any active or passive SI, no intent or plan. Patient denies any acute psychotic symptoms; denies hearing voices, no visions or delusions She denies any manic symptoms, no diminished need for sleep, no irritability or mood swings, no goal directed activity; no grandiosity, no sex inappropriate behavior. No OCD symptoms She reports that sometimes she feels anxious, but "it's normal" she is able to "deal" with anxiety, no panic attacks.   Spoke with patients mother, no safety concerns. Also see BH note

## 2020-05-10 NOTE — ED BEHAVIORAL HEALTH ASSESSMENT NOTE - DESCRIPTION
was agitate, needed sedation, was under influence, Calm and cooperative in Am during the interview   Vital Signs Last 24 Hrs  T(C): 36.4 (10 May 2020 07:04), Max: 36.7 (09 May 2020 22:31)  T(F): 97.5 (10 May 2020 07:04), Max: 98 (09 May 2020 22:31)  HR: 87 (10 May 2020 07:04) (87 - 104)  BP: 122/83 (10 May 2020 07:04) (122/83 - 133/93)  BP(mean): --  RR: 16 (10 May 2020 07:04) (16 - 20)  SpO2: 100% (10 May 2020 07:04) (98% - 100%) denies dating, employed, no children; domiciled with her friend

## 2020-05-10 NOTE — ED BEHAVIORAL HEALTH ASSESSMENT NOTE - SUICIDE PROTECTIVE FACTORS
Responsibility to family and others/Identifies reasons for living/Supportive social network of family or friends/Engaged in work or school/Positive therapeutic relationships/Ability to cope with stress/Has future plans

## 2020-05-10 NOTE — ED BEHAVIORAL HEALTH ASSESSMENT NOTE - SUMMARY
The patient is 28 yo single, employed as a , childless, domiciled with her friend AA female with HX of depression, currently in treatment with Dr Butler, compliant with meds and treatment, no prior psych admissions, no HX of SI or SA, no Hx of SIB , was brought to ER after she got agitated while intoxicated.   Patient reports that she used "candies with marijuana, most likely the mj was laced" she does not remember being loud or agitated, states that she is better now, feels rested and wants to go home. She admits that she has history of depression, but states that she is at her baseline, She is sleeping OK, has fair energy level, denies feeling hopeless or helpless but sometimes she feels sad "because what is going on now" , no other new stressors. She denies any active or passive SI, no intent or plan. Patient denies any acute psychotic symptoms; denies hearing voices, no visions or delusions She denies any manic symptoms, no diminished need for sleep, no irritability or mood swings, no goal directed activity; no grandiosity, no sex inappropriate behavior. No OCD symptoms She reports that sometimes she feels anxious, but "it's normal" she is able to "deal" with anxiety, no panic attacks.     patient is calm, pleasant. No psychotic, manic symptoms, Admits to depressive symptoms, no suicidality, no active or passive SI, no S I/I/P denies feeling anxious, No safety concerns No HX of SA. Employed, future oriented, dating, domiciled and has supportive family

## 2020-05-10 NOTE — ED BEHAVIORAL HEALTH ASSESSMENT NOTE - RISK ASSESSMENT
patient is calm, pleasant. No psychotic, manic symptoms, Admits to depressive symptoms, no suicidality, no active or passive SI, no S I/I/P denies feeling anxious, No safety concerns No HX of SA. Employed, future oriented, dating, domiciled and has supportive family,   Hx of depression from age 18 Low Acute Suicide Risk

## 2020-05-10 NOTE — ED BEHAVIORAL HEALTH ASSESSMENT NOTE - VIOLENCE PROTECTIVE FACTORS:
Residential stability/Engagement in treatment/Relationship stability/Employment stability/Good treatment response/compliance

## 2020-05-10 NOTE — ED BEHAVIORAL HEALTH NOTE - BEHAVIORAL HEALTH NOTE
tried to see the patient earlier and now, she was sedated secondary to agitation on arrival and also admitted to smoking K2 . utox is + for cannabis. See collateral information from parents.  Will try to evaluate patient early in the morning.

## 2020-05-10 NOTE — ED BEHAVIORAL HEALTH ASSESSMENT NOTE - SAFETY PLAN DETAILS
patient will see her psych providers as scheduled, will continue Cymbalta 80 mg daily. She will return to Er if symptoms worsen. She states that she "knows what to do" if she feels worse

## 2020-05-10 NOTE — ED ADULT NURSE REASSESSMENT NOTE - NS ED NURSE REASSESS COMMENT FT1
Psychiatric consult completed; reassessment vitals as noted. Pt provided with juice. Awaiting further MD orders.
Pt continues with sleep; respirations even and non labored. Will notify Psychiatry when pt is awake and able to participate in psychiatric consult.
Pt received at 12:30am shift change. Pt presents sleeping, per RN handoff pt arrived with psychotic features and was medicated with + effect. Pt is now awaiting psychiatric consultation and results of lab work.

## 2020-05-10 NOTE — ED BEHAVIORAL HEALTH ASSESSMENT NOTE - REFERRAL / APPOINTMENT DETAILS
Patient has an appointment with his psychiatrist Dr josh Butler in [facility] on 5/15. She also has an appointment with her therapist  Dr Ismael Palencia on 5/14

## 2020-05-10 NOTE — ED BEHAVIORAL HEALTH NOTE - BEHAVIORAL HEALTH NOTE
Per collateral from patient's mother and father (): patient lives on her own, and they last spoke with her several days ago. At that time, the patient was her usual self and was not behaving at all strangely. Parents report that the patient takes an unknown medication for depression but that she otherwise has no prior psychiatric history (no psychiatric admissions, no emergency psychiatric evaluations, no prior episodes of agitation/bizarre behavior). No knowledge of suicidality/homicidality. Patient smokes cigarettes and drinks alcohol on occasion, but parents deny knowledge of alcohol dependence or any illicit substance use, including marijuana. Per collateral from patient's mother and father (): patient does not live with them, and they last spoke with her several days ago. At that time, the patient was her usual self and was not behaving at all strangely. Parents report that the patient takes an unknown medication for depression but that she otherwise has no prior psychiatric history (no psychiatric admissions, no emergency psychiatric evaluations, no prior episodes of agitation/bizarre behavior). No knowledge of suicidality/homicidality. Patient smokes cigarettes and drinks alcohol on occasion, but parents deny knowledge of alcohol dependence or any illicit substance use, including marijuana. They deny knowledge of general medical problems or daily medications (other than unknown medication for depression, as stated above).

## 2020-05-10 NOTE — ED BEHAVIORAL HEALTH ASSESSMENT NOTE - OTHER PAST PSYCHIATRIC HISTORY (INCLUDE DETAILS REGARDING ONSET, COURSE OF ILLNESS, INPATIENT/OUTPATIENT TREATMENT)
No HX of psych admissions, States that she started seeing a psychiatrist for depression from age 18, Has been in treatment off and on since then. Compliant with Tx at present time

## 2020-05-11 PROBLEM — F32.9 MAJOR DEPRESSIVE DISORDER, SINGLE EPISODE, UNSPECIFIED: Chronic | Status: ACTIVE | Noted: 2018-01-19

## 2022-02-01 NOTE — H&P ADULT - PROBLEM/PLAN-2
[facility] [address] [address] EMERGENCY DEPT  [address] [place] [id number]  Phone: [phone]             February 1, 2022    Patient: Hubbard So   YOB: 2005   Date of Visit: 2/1/2022       To Whom It May Concern: Vangie Bansal was seen and treated in our emergency department on 2/1/2022. She may return to school today.       Sincerely,             Signature:__________________________________ DISPLAY PLAN FREE TEXT

## 2022-08-24 NOTE — ED ADULT NURSE NOTE - PMH
Hydrocodone for severe pain.  Note that hydrocodone also contains acetaminophen.    Ice and elevate right foot.    Ace wrap for comfort   Depression

## 2022-10-03 NOTE — PATIENT PROFILE ADULT. - CAREGIVER
Lexapro       Last Written Prescription Date:  9/22/22  Last Fill Quantity: 60,   # refills: 0    Gabapentin       Last Written Prescription Date:  9/22/22  Last Fill Quantity: 90,   # refills: 0  Last Office Visit: 12/7/21  Future Office visit:    Next 5 appointments (look out 90 days)    Oct 03, 2022  4:30 PM  Return Visit with Noah Bloom DC  [facility] ([facility] ) [address] [place] [address]  [place] [address]  [phone]   Oct 06, 2022  8:00 AM  (Arrive by 7:45 AM)  SHORT with Balta Milton DO  [facility] ([facility] ) [address]  [place] [id number] [phone]              Declines

## 2022-12-15 NOTE — DISCHARGE NOTE ADULT - CARE PLAN
Principal Discharge DX:	New onset seizure  Goal:	follow up with Dr CATHERINE [phone]  Assessment and plan of treatment:	follow up with pmd in 1 week  Secondary Diagnosis:	Depression, unspecified depression type Principal Discharge DX:	New onset seizure  Goal:	follow up with Dr CATHERINE [phone]  Assessment and plan of treatment:	follow up with pmd in 1 week  Secondary Diagnosis:	Depression, unspecified depression type  Assessment and plan of treatment:	Follow up with psychiatrist within 1 week to adjust meds with intent to discontinue use of Bupropion in observance of seizure Erythromycin Counseling:  I discussed with the patient the risks of erythromycin including but not limited to GI upset, allergic reaction, drug rash, diarrhea, increase in liver enzymes, and yeast infections.

## 2024-03-07 NOTE — ED PROVIDER NOTE - CROS ED HEME ALL NEG
From: Rad Wall  To: Leena Beltran  Sent: 3/7/2024 4:28 PM CST  Subject: Refill     I need a refill on my life I  Levothoroxine. Can you please do that?  Thank you   negative...

## 2024-06-16 NOTE — DISCHARGE NOTE ADULT - PRINCIPAL DIAGNOSIS
TRACYI, I advised Landon that I would like him to follow up with you closely. ER precautions for any worsening of symptoms. Thanks.
New onset seizure